# Patient Record
Sex: MALE | Race: BLACK OR AFRICAN AMERICAN
[De-identification: names, ages, dates, MRNs, and addresses within clinical notes are randomized per-mention and may not be internally consistent; named-entity substitution may affect disease eponyms.]

---

## 2018-02-01 ENCOUNTER — HOSPITAL ENCOUNTER (EMERGENCY)
Dept: HOSPITAL 17 - NEPK | Age: 43
Discharge: LEFT BEFORE DISCHARGE | End: 2018-02-01
Payer: SELF-PAY

## 2018-02-01 VITALS
HEART RATE: 86 BPM | TEMPERATURE: 97.5 F | RESPIRATION RATE: 15 BRPM | OXYGEN SATURATION: 99 % | DIASTOLIC BLOOD PRESSURE: 75 MMHG | SYSTOLIC BLOOD PRESSURE: 116 MMHG

## 2018-02-01 VITALS — WEIGHT: 198.42 LBS | BODY MASS INDEX: 31.14 KG/M2 | HEIGHT: 67 IN

## 2018-02-01 DIAGNOSIS — M25.552: Primary | ICD-10-CM

## 2018-02-01 PROCEDURE — 99281 EMR DPT VST MAYX REQ PHY/QHP: CPT

## 2018-02-01 NOTE — PD
HPI


Chief Complaint:  Pain: Acute or Chronic


Time Seen by Provider:  09:57


Travel History


International Travel<30 days:  Yes


Contact w/Intl Traveler<30days:  Yes


Name of Country Traveled to:  [place]


Traveled to known affect area:  No





History of Present Illness


HPI


42-year-old male presents for evaluation of left hip pain.  Symptoms started 

one month ago.  He reports that he was seen at a physician's office and [place] 

when symptoms first started.  He reports that he had an x-ray of his more spine 

was told that he was "out of alignment" and he was referred to a chiropractor.  

Prior to seeing the chiropractor he traveled to [place] for vacation.  The pain 

has persisted since then and this prompted evaluation.  He reports that the 

pain is a mild pain on the lateral aspect of the left hip that occasionally 

shoots down the lateral aspect of left thigh.  Currently the patient has no 

pain.  Pain is primarily reproduced when he is lying on his left side or with 

certain movements.  Denies any back pain, lower extremity swelling, abdominal 

pain, testicular or scrotal pain.  Denies any increase in physical activity.  

He is not currently using any medication for symptom relief.  He has no other 

complaints at this time.





History


Social History


Alcohol Use:  Yes


Tobacco Use:  No





Allergies-Medications


(Allergen,Severity, Reaction):  


Coded Allergies:  


     No Known Allergies (Verified  Allergy, Unknown, 2/1/18)





Review of Systems


General / Constitutional:  No: Fever, Chills


Gastrointestinal:  No: Nausea, Vomiting, Abdominal Pain


Musculoskeletal:  Positive: Pain, No: Limited ROM, Edema


Skin:  Positive Other (denies any open wounds)





Physical Exam


Narrative


GENERAL: Well-developed well-nourished male in no acute distress


SKIN: Warm and dry.


HEAD: Atraumatic. Normocephalic. 


EYES: Pupils equal and round. No scleral icterus. No injection or drainage. 


ENT: No nasal bleeding or discharge.  Mucous membranes pink and moist.


NECK: Trachea midline. No JVD. 


CARDIOVASCULAR: Regular rate and rhythm.  No murmur appreciated.


RESPIRATORY: No accessory muscle use. Clear to auscultation. Breath sounds 

equal bilaterally. 


GASTROINTESTINAL: Abdomen soft, non-tender, nondistended. Hepatic and splenic 

margins not palpable. 


MUSCULOSKELETAL: No obvious deformities.  No lower extremity edema.  There is 

no reproducible tenderness to palpation to the spine, pelvis, hips, thighs, 

legs.  There is no reproducible pain with range of motion activities of the 

lower extremities.  5 out of 5 muscle strength in lower extremity muscle groups.


NEUROLOGICAL: Awake and alert. No obvious cranial nerve deficits.  Motor 

grossly within normal limits. Normal speech.


PSYCHIATRIC: Appropriate mood and affect; insight and judgment normal.





Data


Data


Last Documented VS





Vital Signs








  Date Time  Temp Pulse Resp B/P (MAP) Pulse Ox O2 Delivery O2 Flow Rate FiO2


 


2/1/18 09:55 97.5 86 15 116/75 (89) 99   











MDM


Medical Screen Exam Complete:  Yes


Emergency Medical Condition:  No


Differential Diagnosis


Meralgia paresthetica, iliotibial band syndrome, bursitis, radiculopathy


Narrative Course


42-year-old male who has been experiencing pain in the lateral left thigh 

intermittent shooting down the left leg for the past month with no trauma, 

currently without pain, pain primarily when lying on his left hip.  Physical 

examination is unremarkable.





A medical screening exam was performed: 


At the time of evaluation the presenting medical condition was determined not 

to be of an emergent nature. 


The patient was given the option of receiving additional care, but declined. 


Patient was given options for additional community resources from which to 

obtain care.


The Patient Has Been advised to seek medical attention for their presenting 

complaint.


The patient has been advised to return to the ER at any time if an emergent 

condition develops.





 Primary Impression:  


 Encounter for medical screening examination











Scott Covarrubias Feb 1, 2018 10:07